# Patient Record
Sex: FEMALE | Race: WHITE | Employment: OTHER | URBAN - METROPOLITAN AREA
[De-identification: names, ages, dates, MRNs, and addresses within clinical notes are randomized per-mention and may not be internally consistent; named-entity substitution may affect disease eponyms.]

---

## 2024-09-10 ENCOUNTER — PATIENT MESSAGE (OUTPATIENT)
Dept: OBGYN CLINIC | Facility: CLINIC | Age: 54
End: 2024-09-10

## 2024-09-10 ENCOUNTER — OFFICE VISIT (OUTPATIENT)
Dept: OBGYN CLINIC | Facility: CLINIC | Age: 54
End: 2024-09-10
Payer: COMMERCIAL

## 2024-09-10 VITALS
DIASTOLIC BLOOD PRESSURE: 85 MMHG | WEIGHT: 193.38 LBS | BODY MASS INDEX: 29.31 KG/M2 | HEART RATE: 82 BPM | SYSTOLIC BLOOD PRESSURE: 124 MMHG | HEIGHT: 68 IN

## 2024-09-10 DIAGNOSIS — Z01.419 WELL WOMAN EXAM WITH ROUTINE GYNECOLOGICAL EXAM: Primary | ICD-10-CM

## 2024-09-10 DIAGNOSIS — Z76.0 MEDICATION REFILL: Primary | ICD-10-CM

## 2024-09-10 DIAGNOSIS — Z12.4 SCREENING FOR CERVICAL CANCER: ICD-10-CM

## 2024-09-10 DIAGNOSIS — Z12.31 ENCOUNTER FOR SCREENING MAMMOGRAM FOR MALIGNANT NEOPLASM OF BREAST: ICD-10-CM

## 2024-09-10 PROCEDURE — 88175 CYTOPATH C/V AUTO FLUID REDO: CPT | Performed by: OBSTETRICS & GYNECOLOGY

## 2024-09-10 PROCEDURE — 87624 HPV HI-RISK TYP POOLED RSLT: CPT | Performed by: OBSTETRICS & GYNECOLOGY

## 2024-09-10 RX ORDER — LEVONORGESTREL AND ETHINYL ESTRADIOL 0.15-0.03
1 KIT ORAL DAILY
COMMUNITY
Start: 2023-05-30 | End: 2024-09-11

## 2024-09-10 NOTE — PROGRESS NOTES
Agata Bolden is a 53 year old female  Patient's last menstrual period was 2024.   Chief Complaint   Patient presents with    Gyn Exam     Annual    Presenting for well woman exam. Last pap smear was normal in . Last mammogram was normal last year per patient. Menses beginning to space. No concerns today.     OBSTETRICS HISTORY:  OB History    Para Term  AB Living   2 2 2 0 0 2   SAB IAB Ectopic Multiple Live Births   0 0 0 0 2       GYNE HISTORY:  Patient's last menstrual period was 2024.    History   Sexual Activity    Sexual activity: Yes    Birth control/ protection: Condom        Menarche: 13-14 years old  Period Cycle (Days): 28 days  Period Duration (Days): 4- 5 days  Period Flow: spotting then moderate  Use of Birth Control (if yes, specify type): condoms  Pap Date: 16  Pap Result Notes: Neg Pap/HPV // Had pap  per pt      MEDICAL HISTORY:  Past Medical History:    Asthma (HCC)    Tonsillitis    tonsillectomy     Ulcerative colitis ([facility])         SURGICAL HISTORY:  Past Surgical History:   Procedure Laterality Date    Colonoscopy with biopsy      Colonoscopy,biopsy  2009    per     Tonsillectomy  1984       SOCIAL HISTORY:  Social History     Socioeconomic History    Marital status:      Spouse name: Not on file    Number of children: Not on file    Years of education: Not on file    Highest education level: Not on file   Occupational History    Not on file   Tobacco Use    Smoking status: Never    Smokeless tobacco: Never   Substance and Sexual Activity    Alcohol use: Yes     Comment: socially    Drug use: Never    Sexual activity: Yes     Birth control/protection: Condom   Other Topics Concern     Service Not Asked    Blood Transfusions Not Asked    Caffeine Concern No    Occupational Exposure Not Asked    Hobby Hazards Not Asked    Sleep Concern Not Asked    Stress Concern Not Asked    Weight Concern Not Asked    Special Diet  Not Asked    Back Care Not Asked    Exercise Not Asked    Bike Helmet Not Asked    Seat Belt Not Asked    Self-Exams Not Asked   Social History Narrative    Not on file     Social Determinants of Health     Financial Resource Strain: Low Risk  (7/20/2023)    Received from [facility]    Overall Financial Resource Strain (CARDIA)     Difficulty of Paying Living Expenses: Not hard at all   Food Insecurity: No Food Insecurity (7/20/2023)    Received from [facility]    Hunger Vital Sign     Worried About Running Out of Food in the Last Year: Never true     Ran Out of Food in the Last Year: Never true   Transportation Needs: No Transportation Needs (7/20/2023)    Received from [facility]    PRAPARE - Transportation     Lack of Transportation (Medical): No     Lack of Transportation (Non-Medical): No   Physical Activity: Not on file   Stress: Not on file   Social Connections: Not on file   Housing Stability: Not on file         Depression Screening (PHQ-2/PHQ-9): Over the LAST 2 WEEKS   Little interest or pleasure in doing things: Not at all    Feeling down, depressed, or hopeless: Not at all    PHQ-2 SCORE: 0           MEDICATIONS:    Current Outpatient Medications:     Levonorgestrel-Ethinyl Estrad 0.15-30 MG-MCG Oral Tab, Take 1 tablet by mouth daily., Disp: , Rfl:     ALLERGIES:  No Known Allergies      Review of Systems:  Review of Systems   All other systems reviewed and are negative.       Vitals:    09/10/24 0834   BP: 124/85   Pulse: 82       PHYSICAL EXAM:   Physical Exam  Vitals reviewed.   Constitutional:       Appearance: Normal appearance.   HENT:      Head: Atraumatic.   Eyes:      Pupils: Pupils are equal, round, and reactive to light.   Pulmonary:      Effort: Pulmonary effort is normal.   Chest:   Breasts:     Right: Normal. No bleeding, inverted nipple, mass, nipple discharge, skin change or tenderness.      Left: Normal. No bleeding,  inverted nipple, mass, nipple discharge, skin change or tenderness.   Abdominal:      General: Abdomen is flat.      Palpations: Abdomen is soft.      Tenderness: There is no abdominal tenderness.   Genitourinary:     General: Normal vulva.      Exam position: Lithotomy position.      Labia:         Right: No rash, tenderness, lesion or injury.         Left: No rash, tenderness, lesion or injury.       Vagina: Normal.      Cervix: Normal.      Uterus: Normal. Not tender.       Adnexa: Right adnexa normal and left adnexa normal.        Right: No tenderness or fullness.          Left: No tenderness or fullness.     Lymphadenopathy:      Upper Body:      Right upper body: No supraclavicular, axillary or pectoral adenopathy.      Left upper body: No supraclavicular, axillary or pectoral adenopathy.   Skin:     General: Skin is warm and dry.   Neurological:      General: No focal deficit present.      Mental Status: She is alert and oriented to person, place, and time.   Psychiatric:         Mood and Affect: Mood normal.         Behavior: Behavior normal.         Thought Content: Thought content normal.         Judgment: Judgment normal.           Assessment & Plan:  Christy was seen today for gyn exam.    Diagnoses and all orders for this visit:    Well woman exam with routine gynecological exam    Encounter for screening mammogram for malignant neoplasm of breast  -     [facility] MARISOL 2D+3D SCREENING BILAT (CPT=77067/03614); Future    Screening for cervical cancer  -     ThinPrep PAP Smear; Future  -     Hpv Dna  High Risk , Thin Prep Collect; Future  -     Hpv Dna  High Risk , Thin Prep Collect  -     ThinPrep PAP Smear        Requested Prescriptions      No prescriptions requested or ordered in this encounter       Pap w/ HPV done. ASSCP guidelines reviewed.  Annual exams encouraged. Call if any abnormal vaginal bleeding.  Mammogram order given.  SBE encouraged.

## 2024-09-11 LAB — HPV E6+E7 MRNA CVX QL NAA+PROBE: NEGATIVE

## 2024-09-11 RX ORDER — LEVONORGESTREL AND ETHINYL ESTRADIOL 0.15-0.03
1 KIT ORAL DAILY
Qty: 84 TABLET | Refills: 3 | Status: SHIPPED | OUTPATIENT
Start: 2024-09-11

## 2024-09-11 NOTE — TELEPHONE ENCOUNTER
To Dr. Canela- patient requesting refill on Oral contraceptive, but also asking if change recommended due to perimenopause.

## 2024-09-11 NOTE — TELEPHONE ENCOUNTER
From: Agata Bolden  To: Lin Canela  Sent: 9/10/2024 9:48 PM CDT  Subject: Refill    Hi Dr. Canela. I forgot to ask today if you can refill my Kurvelo prescription. And with menopause possibly starting, do you suggest a different pill?    Thanks,  Christy

## 2024-09-12 NOTE — TELEPHONE ENCOUNTER
Ok to refill, I forgot to refill at appointment. Ok to remain on OCP and we can discuss possibly stopping next year.

## 2024-09-17 ENCOUNTER — HOSPITAL ENCOUNTER (OUTPATIENT)
Dept: MAMMOGRAPHY | Age: 54
Discharge: HOME | End: 2024-09-17
Attending: OBSTETRICS & GYNECOLOGY
Payer: COMMERCIAL

## 2024-09-17 DIAGNOSIS — Z12.31 ENCOUNTER FOR SCREENING MAMMOGRAM FOR MALIGNANT NEOPLASM OF BREAST: ICD-10-CM

## 2024-09-17 PROCEDURE — 77063 BREAST TOMOSYNTHESIS BI: CPT | Performed by: OBSTETRICS & GYNECOLOGY

## 2024-09-17 PROCEDURE — 77067 SCR MAMMO BI INCL CAD: CPT | Performed by: OBSTETRICS & GYNECOLOGY

## 2025-08-14 ENCOUNTER — TELEPHONE (OUTPATIENT)
Dept: OBGYN CLINIC | Facility: CLINIC | Age: 55
End: 2025-08-14